# Patient Record
Sex: FEMALE | Race: WHITE | NOT HISPANIC OR LATINO | URBAN - METROPOLITAN AREA
[De-identification: names, ages, dates, MRNs, and addresses within clinical notes are randomized per-mention and may not be internally consistent; named-entity substitution may affect disease eponyms.]

---

## 2017-01-10 ENCOUNTER — OFFICE (OUTPATIENT)
Dept: URBAN - METROPOLITAN AREA CLINIC 11 | Facility: CLINIC | Age: 42
End: 2017-01-10

## 2017-01-10 VITALS
SYSTOLIC BLOOD PRESSURE: 139 MMHG | WEIGHT: 171 LBS | DIASTOLIC BLOOD PRESSURE: 65 MMHG | HEART RATE: 70 BPM | HEIGHT: 68 IN

## 2017-01-10 DIAGNOSIS — R10.31 RIGHT LOWER QUADRANT PAIN: ICD-10-CM

## 2017-01-10 DIAGNOSIS — K92.2 GASTROINTESTINAL HEMORRHAGE, UNSPECIFIED: ICD-10-CM

## 2017-01-10 DIAGNOSIS — R10.13 EPIGASTRIC PAIN: ICD-10-CM

## 2017-01-10 DIAGNOSIS — R10.32 LEFT LOWER QUADRANT PAIN: ICD-10-CM

## 2017-01-10 PROCEDURE — 99214 OFFICE O/P EST MOD 30 MIN: CPT | Performed by: INTERNAL MEDICINE

## 2017-01-10 NOTE — SERVICEHPINOTES
She has had intermittent diarrhea and has had green diarrhea about 5 times a day over the weekend and then noted maroon blood in her stool over the weekend (but was not seen).  She had the dark maroon blood three times on Sunday.  She had three stools yesterday with diarrhea and did not have a stool today.  She has not had fevers or chills.  She stated that she varies between diarrhea and constipation as her "normal pattern". She did take Pepto tablets yesterday and the bloating and discomfort were better.   She has been using Smooth Moves Tea to treat the constipation.   She has continued to have a pain in the LLQ and RLQ.  The pain has been a sharp stabbing pain.  It has been particularly worse with apples and oatmeal.  The pain starts within about 5-10 minutes of eating. There is both an intermittent also persistent pain. She did do the M2A but it was incomplete in that the pill cam did not appear to leave the stomach.  Follow up Xrays were without the pill cam being noted.

## 2017-01-10 NOTE — SERVICENOTES
We reviewed her studies and discussed the recurrent symptoms and issues.  The etiology is not quite clear and this seems to be a couple of different issues.  One is more of a functional type pain (ensure challenge u/s to r/o celiac flow issues is ordered), one is occult bleeding, endometriosis, and the other may be muscular wall pain.  I will contniued her eval with the above labs and studies with a referral to Dr. Davalos for eval as well. She was also given smaple of IBgard for her dyspepsia issues.

## 2017-01-14 LAB
AMYLASE: AMYLASE,SERUM: 188 U/L — HIGH (ref 28–120)
CBC COMPLETE BLOOD COUNT W/O DIFF: HEMATOCRIT: 38 % (ref 36–48)
CBC COMPLETE BLOOD COUNT W/O DIFF: HEMOGLOBIN: 12.7 G/DL (ref 12–16)
CBC COMPLETE BLOOD COUNT W/O DIFF: MCH: 30.2 PG (ref 25–35)
CBC COMPLETE BLOOD COUNT W/O DIFF: MCHC: 33.4 % (ref 30–38)
CBC COMPLETE BLOOD COUNT W/O DIFF: MCV: 90.5 FL (ref 78–102)
CBC COMPLETE BLOOD COUNT W/O DIFF: PLATELET COUNT: 415 K/UL (ref 150–450)
CBC COMPLETE BLOOD COUNT W/O DIFF: RBC DISTRIBUTION WIDTH: 15 % (ref 11.5–16)
CBC COMPLETE BLOOD COUNT W/O DIFF: RED BLOOD CELL COUNT: 4.2 M/UL (ref 4–5.5)
CBC COMPLETE BLOOD COUNT W/O DIFF: WHITE BLOOD CELL COUNT: 9.5 K/UL (ref 4–11)
COMPREHENSIVE METABOLIC PANEL: ALBUMIN: 4.5 G/DL (ref 3.5–5.2)
COMPREHENSIVE METABOLIC PANEL: ALKALINE PHOSPHATASE: 62 U/L (ref 34–115)
COMPREHENSIVE METABOLIC PANEL: CALCIUM TOTAL: 9.8 MG/DL (ref 8.5–10.5)
COMPREHENSIVE METABOLIC PANEL: CARBON DIOXIDE: 24 MEQ/L (ref 21–31)
COMPREHENSIVE METABOLIC PANEL: CHLORIDE: 100 MEQ/L (ref 96–106)
COMPREHENSIVE METABOLIC PANEL: CREATININE: 1.04 MG/DL (ref 0.6–1.3)
COMPREHENSIVE METABOLIC PANEL: FASTING/NON-FASTING: (no result)
COMPREHENSIVE METABOLIC PANEL: GLUCOSE: 86 MG/DL (ref 65–100)
COMPREHENSIVE METABOLIC PANEL: POTASSIUM: 4 MEQ/ML (ref 3.5–5.4)
COMPREHENSIVE METABOLIC PANEL: SGOT (AST): 22 U/L (ref 13–40)
COMPREHENSIVE METABOLIC PANEL: SGPT (ALT): 19 U/L (ref 7–52)
COMPREHENSIVE METABOLIC PANEL: SODIUM: 141 MEQ/L (ref 135–145)
COMPREHENSIVE METABOLIC PANEL: TOTAL BILIRUBIN: 0.4 MG/DL (ref 0.3–1.2)
COMPREHENSIVE METABOLIC PANEL: TOTAL PROTEIN: 7.6 G/DL (ref 6.4–8.3)
COMPREHENSIVE METABOLIC PANEL: UREA NITROGEN: 16 MG/DL (ref 6–20)
LIPASE: 70 U/L (ref 11–82)
Lab: (no result)
Lab: (no result)
Lab: >90 %
SED RATE - ERYTHROCYTE SED RATE: SED RATE: 19 MM/HR

## 2017-04-06 ENCOUNTER — ON CAMPUS - OUTPATIENT (OUTPATIENT)
Dept: URBAN - METROPOLITAN AREA HOSPITAL 97 | Facility: HOSPITAL | Age: 42
End: 2017-04-06
Payer: COMMERCIAL

## 2017-04-06 DIAGNOSIS — R19.5 OTHER FECAL ABNORMALITIES: ICD-10-CM

## 2017-04-06 DIAGNOSIS — R10.84 GENERALIZED ABDOMINAL PAIN: ICD-10-CM

## 2017-04-06 DIAGNOSIS — K29.70 GASTRITIS, UNSPECIFIED, WITHOUT BLEEDING: ICD-10-CM

## 2017-04-06 PROCEDURE — 99214 OFFICE O/P EST MOD 30 MIN: CPT | Performed by: INTERNAL MEDICINE

## 2017-04-07 PROCEDURE — 43239 EGD BIOPSY SINGLE/MULTIPLE: CPT | Performed by: INTERNAL MEDICINE

## 2017-10-19 ENCOUNTER — OFFICE (OUTPATIENT)
Dept: URBAN - METROPOLITAN AREA CLINIC 11 | Facility: CLINIC | Age: 42
End: 2017-10-19
Payer: COMMERCIAL

## 2017-10-19 VITALS
SYSTOLIC BLOOD PRESSURE: 141 MMHG | WEIGHT: 162 LBS | DIASTOLIC BLOOD PRESSURE: 67 MMHG | HEIGHT: 68 IN | HEART RATE: 81 BPM

## 2017-10-19 DIAGNOSIS — R94.5 ABNORMAL RESULTS OF LIVER FUNCTION STUDIES: ICD-10-CM

## 2017-10-19 DIAGNOSIS — R10.31 RIGHT LOWER QUADRANT PAIN: ICD-10-CM

## 2017-10-19 LAB
ANTINUCLEAR ANTIBODIES, IFA: NEGATIVE
CBC, PLATELET, NO DIFFERENTIAL: HEMATOCRIT: 36.7 % (ref 34–46.6)
CBC, PLATELET, NO DIFFERENTIAL: HEMOGLOBIN: 12.1 G/DL (ref 11.1–15.9)
CBC, PLATELET, NO DIFFERENTIAL: MCH: 29.8 PG (ref 26.6–33)
CBC, PLATELET, NO DIFFERENTIAL: MCHC: 33 G/DL (ref 31.5–35.7)
CBC, PLATELET, NO DIFFERENTIAL: MCV: 90 FL (ref 79–97)
CBC, PLATELET, NO DIFFERENTIAL: PLATELETS: 382 X10E3/UL — HIGH (ref 150–379)
CBC, PLATELET, NO DIFFERENTIAL: RBC: 4.06 X10E6/UL (ref 3.77–5.28)
CBC, PLATELET, NO DIFFERENTIAL: RDW: 14.1 % (ref 12.3–15.4)
CBC, PLATELET, NO DIFFERENTIAL: WBC: 4.7 X10E3/UL (ref 3.4–10.8)
HEPATIC FUNCTION PANEL (7): ALBUMIN, SERUM: 4.7 G/DL (ref 3.5–5.5)
HEPATIC FUNCTION PANEL (7): ALKALINE PHOSPHATASE, S: 76 IU/L (ref 39–117)
HEPATIC FUNCTION PANEL (7): ALT (SGPT): 10 IU/L (ref 0–32)
HEPATIC FUNCTION PANEL (7): AST (SGOT): 14 IU/L (ref 0–40)
HEPATIC FUNCTION PANEL (7): BILIRUBIN, DIRECT: 0.1 MG/DL (ref 0–0.4)
HEPATIC FUNCTION PANEL (7): BILIRUBIN, TOTAL: 0.2 MG/DL (ref 0–1.2)
HEPATIC FUNCTION PANEL (7): PROTEIN, TOTAL, SERUM: 7.2 G/DL (ref 6–8.5)

## 2017-10-19 PROCEDURE — 99214 OFFICE O/P EST MOD 30 MIN: CPT | Performed by: INTERNAL MEDICINE

## 2017-12-27 ENCOUNTER — OFFICE (OUTPATIENT)
Dept: URBAN - METROPOLITAN AREA CLINIC 11 | Facility: CLINIC | Age: 42
End: 2017-12-27
Payer: COMMERCIAL

## 2017-12-27 VITALS
HEART RATE: 73 BPM | WEIGHT: 155 LBS | SYSTOLIC BLOOD PRESSURE: 126 MMHG | HEIGHT: 68 IN | DIASTOLIC BLOOD PRESSURE: 73 MMHG

## 2017-12-27 DIAGNOSIS — R10.9 UNSPECIFIED ABDOMINAL PAIN: ICD-10-CM

## 2017-12-27 LAB
ANEMIA PROFILE A: BASO (ABSOLUTE): 0 X10E3/UL (ref 0–0.2)
ANEMIA PROFILE A: BASOS: 1 %
ANEMIA PROFILE A: EOS (ABSOLUTE): 0.3 X10E3/UL (ref 0–0.4)
ANEMIA PROFILE A: EOS: 5 %
ANEMIA PROFILE A: HEMATOCRIT: 37.4 % (ref 34–46.6)
ANEMIA PROFILE A: HEMATOLOGY COMMENTS: (no result)
ANEMIA PROFILE A: HEMOGLOBIN: 12.1 G/DL (ref 11.1–15.9)
ANEMIA PROFILE A: IMMATURE CELLS: (no result)
ANEMIA PROFILE A: IMMATURE GRANS (ABS): 0 X10E3/UL (ref 0–0.1)
ANEMIA PROFILE A: IMMATURE GRANULOCYTES: 0 %
ANEMIA PROFILE A: IRON BIND.CAP.(TIBC): 348 UG/DL (ref 250–450)
ANEMIA PROFILE A: IRON SATURATION: 24 % (ref 15–55)
ANEMIA PROFILE A: IRON, SERUM: 83 UG/DL (ref 27–159)
ANEMIA PROFILE A: LYMPHS (ABSOLUTE): 1.7 X10E3/UL (ref 0.7–3.1)
ANEMIA PROFILE A: LYMPHS: 27 %
ANEMIA PROFILE A: MCH: 28.9 PG (ref 26.6–33)
ANEMIA PROFILE A: MCHC: 32.4 G/DL (ref 31.5–35.7)
ANEMIA PROFILE A: MCV: 90 FL (ref 79–97)
ANEMIA PROFILE A: MONOCYTES(ABSOLUTE): 0.4 X10E3/UL (ref 0.1–0.9)
ANEMIA PROFILE A: MONOCYTES: 7 %
ANEMIA PROFILE A: NEUTROPHILS (ABSOLUTE): 3.9 X10E3/UL (ref 1.4–7)
ANEMIA PROFILE A: NEUTROPHILS: 60 %
ANEMIA PROFILE A: NRBC: (no result)
ANEMIA PROFILE A: PLATELETS: 305 X10E3/UL (ref 150–379)
ANEMIA PROFILE A: RBC: 4.18 X10E6/UL (ref 3.77–5.28)
ANEMIA PROFILE A: RDW: 14.5 % (ref 12.3–15.4)
ANEMIA PROFILE A: RETICULOCYTE COUNT: 1 % (ref 0.6–2.6)
ANEMIA PROFILE A: UIBC: 265 UG/DL (ref 131–425)
ANEMIA PROFILE A: WBC: 6.4 X10E3/UL (ref 3.4–10.8)

## 2017-12-27 PROCEDURE — 99213 OFFICE O/P EST LOW 20 MIN: CPT | Performed by: INTERNAL MEDICINE

## 2017-12-27 NOTE — SERVICEHPINOTES
43 yo F patient and  well known to me from previous hospitalization. She was last seen by my colleague Dr. Lerner on 10/2017, at which time a UGI with SBFT was ordered that was unremarkable. Pt states that she is on Plavix for a heart condition, and her cardiologist feels strongly that she needs to stay on this medication.  She also is in b/l foot braces due to Achilles tendon injury that she attributes to one dose of Cipro. She is concerned as last week she had an episode of large volume hematochezia (whole toilet bowl full of red blood), and since then she has had no further bleeding. This was associated with right and left lower quadrant pain. This pain was previously attributed to her GYN organs, and in9/2017 her partial hysterectomy was converted to a full hysterecomy. Ovaries were removed as well. She is currently having some residual lower abdominal discomfort, but no pain. No fevers, chills, CP, SOB, or hematemesis.

## 2017-12-29 ENCOUNTER — OFFICE (OUTPATIENT)
Dept: URBAN - METROPOLITAN AREA CLINIC 11 | Facility: CLINIC | Age: 42
End: 2017-12-29
Payer: COMMERCIAL

## 2017-12-29 DIAGNOSIS — K92.1 MELENA: ICD-10-CM

## 2017-12-29 PROCEDURE — 91110 GI TRC IMG INTRAL ESOPH-ILE: CPT | Performed by: INTERNAL MEDICINE

## 2018-01-10 ENCOUNTER — OFFICE (OUTPATIENT)
Dept: URBAN - METROPOLITAN AREA CLINIC 11 | Facility: CLINIC | Age: 43
End: 2018-01-10

## 2018-01-10 VITALS — HEIGHT: 68 IN | SYSTOLIC BLOOD PRESSURE: 136 MMHG | DIASTOLIC BLOOD PRESSURE: 77 MMHG | HEART RATE: 73 BPM

## 2018-01-10 DIAGNOSIS — R10.9 UNSPECIFIED ABDOMINAL PAIN: ICD-10-CM

## 2018-01-10 PROCEDURE — 99213 OFFICE O/P EST LOW 20 MIN: CPT | Performed by: INTERNAL MEDICINE

## 2018-12-14 ENCOUNTER — OFFICE (OUTPATIENT)
Dept: URBAN - METROPOLITAN AREA CLINIC 11 | Facility: CLINIC | Age: 43
End: 2018-12-14
Payer: COMMERCIAL

## 2018-12-14 VITALS
HEART RATE: 69 BPM | WEIGHT: 160 LBS | SYSTOLIC BLOOD PRESSURE: 131 MMHG | HEIGHT: 68 IN | DIASTOLIC BLOOD PRESSURE: 86 MMHG

## 2018-12-14 DIAGNOSIS — R19.7 DIARRHEA, UNSPECIFIED: ICD-10-CM

## 2018-12-14 PROCEDURE — 99213 OFFICE O/P EST LOW 20 MIN: CPT | Performed by: INTERNAL MEDICINE

## 2018-12-14 NOTE — SERVICEHPINOTES
She presents for evaluation of diarrhea.  She has had some issues with diarrhea since being on an antibiotic that she did not recall.  She is having about 5 stools a day and they are brown and liquid.  She has also been on a steroid back for her achilles surgical issues.  She has not had blood in her stools.  She has had some issues wiht a point pain in the LLQ and RLQ as she had ha in years past.  It seems to correlate at times with the dairrhea and sometimes not(she has had an extensive evaluation in the past with no particular cause but it did improve with pelvic floor therapy).  She had a GI bleed a year ago with an extensive evaluation without findings. She has been meds due to bilater achilles repairs. She has had a PFO closure and is on Plavix long term

## 2018-12-14 NOTE — SERVICENOTES
We discussed her recent diarrhea and leandro thtis may be an antibiotic associated colitis.  Stools were ordered.  She has also been taking a smooth move tea with senna which could cause issues.  If her dairrhea persist and stools are negative we can discuss an FSC with bx.

## 2019-09-17 ENCOUNTER — OFFICE (OUTPATIENT)
Dept: URBAN - METROPOLITAN AREA CLINIC 11 | Facility: CLINIC | Age: 44
End: 2019-09-17

## 2019-09-17 VITALS
WEIGHT: 178 LBS | HEIGHT: 68 IN | SYSTOLIC BLOOD PRESSURE: 130 MMHG | HEART RATE: 71 BPM | DIASTOLIC BLOOD PRESSURE: 68 MMHG

## 2019-09-17 DIAGNOSIS — K21.9 GASTRO-ESOPHAGEAL REFLUX DISEASE WITHOUT ESOPHAGITIS: ICD-10-CM

## 2019-09-17 DIAGNOSIS — K59.00 CONSTIPATION, UNSPECIFIED: ICD-10-CM

## 2019-09-17 PROCEDURE — 99214 OFFICE O/P EST MOD 30 MIN: CPT | Performed by: INTERNAL MEDICINE

## 2019-09-17 RX ORDER — FAMOTIDINE 40 MG/1
40 TABLET, FILM COATED ORAL
Qty: 90 | Refills: 2 | Status: ACTIVE
Start: 2019-09-17

## 2019-09-17 RX ORDER — PANTOPRAZOLE SODIUM 40 MG/1
TABLET, DELAYED RELEASE ORAL
Qty: 60 | Refills: 2 | Status: ACTIVE

## 2019-09-17 RX ORDER — PRUCALOPRIDE 2 MG/1
2 TABLET, FILM COATED ORAL
Qty: 30 | Refills: 2 | Status: COMPLETED
Start: 2019-09-17 | End: 2019-09-25

## 2019-09-17 NOTE — SERVICENOTES
We discussed her new dx of MTHFR and potential difficulties with some meds.  I will continue the PPIs for her GERD add Pepcid.  With her constipation, we can do a trial of Motegrity (her GFR is over 60).  We discussed her list of food allergies and referral to Dr. Espinal.

## 2019-09-17 NOTE — SERVICEHPINOTES
She presents stating that she has had further issues with reflux and was recently dx with mutiple food allergies (it is quite an extensive list) as wellas MTHFR and COMT genes.  She has also been noted to have a decrease in her GFR and has seen nephrology.  She was found to have elevated Cholesterol and Triglycerides and can not tolerate statins and is on a fish product.  She has had issues with her reflux for a while and had not had time to be seen.  She stated that if she is out of her Protonix for a day, she will have a burning in her chest.  "I will be dying.".  She has had some nocturnal regurgitation as well.  She has been on Protonix twice daily.  She has not had dysphagia.   She has noted difficulties with constipation, which increased after a recent trip to [place].  She has started skipping upto three days.  She has restarted her smooth moves tea and her stools have improved again but it causes very soft stool.

## 2019-12-17 ENCOUNTER — OFFICE (OUTPATIENT)
Dept: URBAN - METROPOLITAN AREA CLINIC 11 | Facility: CLINIC | Age: 44
End: 2019-12-17

## 2019-12-17 VITALS — DIASTOLIC BLOOD PRESSURE: 38 MMHG | HEIGHT: 67 IN | WEIGHT: 173 LBS | SYSTOLIC BLOOD PRESSURE: 117 MMHG

## 2019-12-17 DIAGNOSIS — K92.2 GASTROINTESTINAL HEMORRHAGE, UNSPECIFIED: ICD-10-CM

## 2019-12-17 DIAGNOSIS — R19.7 DIARRHEA, UNSPECIFIED: ICD-10-CM

## 2019-12-17 DIAGNOSIS — K21.9 GASTRO-ESOPHAGEAL REFLUX DISEASE WITHOUT ESOPHAGITIS: ICD-10-CM

## 2019-12-17 PROCEDURE — 99214 OFFICE O/P EST MOD 30 MIN: CPT | Performed by: INTERNAL MEDICINE

## 2019-12-17 RX ORDER — PRUCALOPRIDE 1 MG/1
1 TABLET, FILM COATED ORAL
Qty: 90 | Refills: 2 | Status: ACTIVE
Start: 2019-12-17

## 2019-12-17 NOTE — SERVICEHPINOTES
She presetns for f/u of her IBS.  She has been having difficulties with diarrhea and constipation.  She has been using Smooth Moves Tea nightly to avoid constipation (she would not have a stool that next day). Without the tea, she will not have a stool for three days.  This however has been causing issues with dairrhea.  She has worsening diarrhea after eating.She has had some intermittent red blood with her stools.  She has a hx of prior GI bleeding.She has been on Protonix and if she misses it she will have heartburn and nocturanl backwash.  On the BID Protonix she has control of her symptoms.She has been on Plavix for her hx of CVA.

## 2019-12-17 NOTE — SERVICENOTES
Wtih her continued diarrhea, which I suspect his OTC med related, and bleeding, I would do the f/u colonoscopy.  In addition, I would have her try the renal dosing of the Motegrity and stop the teas.  With her GERD control issues and hx of Saul's, I would do the f/u EGD as well including small bowel bx.

## 2020-02-10 ENCOUNTER — AMBULATORY SURGICAL CENTER (OUTPATIENT)
Dept: URBAN - METROPOLITAN AREA SURGERY 3 | Facility: SURGERY | Age: 45
End: 2020-02-10

## 2020-02-10 ENCOUNTER — OFFICE (OUTPATIENT)
Dept: URBAN - METROPOLITAN AREA PATHOLOGY 22 | Facility: PATHOLOGY | Age: 45
End: 2020-02-10

## 2020-02-10 ENCOUNTER — AMBULATORY SURGICAL CENTER (OUTPATIENT)
Dept: URBAN - METROPOLITAN AREA SURGERY 3 | Facility: SURGERY | Age: 45
End: 2020-02-10
Payer: COMMERCIAL

## 2020-02-10 VITALS
OXYGEN SATURATION: 98 % | DIASTOLIC BLOOD PRESSURE: 71 MMHG | DIASTOLIC BLOOD PRESSURE: 73 MMHG | HEIGHT: 67 IN | RESPIRATION RATE: 18 BRPM | DIASTOLIC BLOOD PRESSURE: 72 MMHG | DIASTOLIC BLOOD PRESSURE: 71 MMHG | HEART RATE: 56 BPM | SYSTOLIC BLOOD PRESSURE: 114 MMHG | HEIGHT: 67 IN | RESPIRATION RATE: 17 BRPM | RESPIRATION RATE: 22 BRPM | DIASTOLIC BLOOD PRESSURE: 54 MMHG | DIASTOLIC BLOOD PRESSURE: 54 MMHG | SYSTOLIC BLOOD PRESSURE: 102 MMHG | HEART RATE: 57 BPM | DIASTOLIC BLOOD PRESSURE: 77 MMHG | DIASTOLIC BLOOD PRESSURE: 71 MMHG | RESPIRATION RATE: 17 BRPM | TEMPERATURE: 98.1 F | RESPIRATION RATE: 22 BRPM | WEIGHT: 165 LBS | SYSTOLIC BLOOD PRESSURE: 102 MMHG | TEMPERATURE: 98.8 F | HEART RATE: 56 BPM | SYSTOLIC BLOOD PRESSURE: 102 MMHG | DIASTOLIC BLOOD PRESSURE: 72 MMHG | TEMPERATURE: 98.1 F | RESPIRATION RATE: 17 BRPM | HEART RATE: 57 BPM | SYSTOLIC BLOOD PRESSURE: 112 MMHG | DIASTOLIC BLOOD PRESSURE: 73 MMHG | OXYGEN SATURATION: 99 % | RESPIRATION RATE: 18 BRPM | HEART RATE: 57 BPM | OXYGEN SATURATION: 98 % | TEMPERATURE: 98.1 F | OXYGEN SATURATION: 98 % | DIASTOLIC BLOOD PRESSURE: 77 MMHG | RESPIRATION RATE: 22 BRPM | DIASTOLIC BLOOD PRESSURE: 73 MMHG | OXYGEN SATURATION: 99 % | SYSTOLIC BLOOD PRESSURE: 114 MMHG | OXYGEN SATURATION: 99 % | WEIGHT: 165 LBS | TEMPERATURE: 98.8 F | DIASTOLIC BLOOD PRESSURE: 54 MMHG | RESPIRATION RATE: 16 BRPM | SYSTOLIC BLOOD PRESSURE: 92 MMHG | OXYGEN SATURATION: 96 % | WEIGHT: 165 LBS | TEMPERATURE: 98.8 F | HEART RATE: 56 BPM | DIASTOLIC BLOOD PRESSURE: 72 MMHG | SYSTOLIC BLOOD PRESSURE: 114 MMHG | HEIGHT: 67 IN | SYSTOLIC BLOOD PRESSURE: 112 MMHG | RESPIRATION RATE: 16 BRPM | SYSTOLIC BLOOD PRESSURE: 92 MMHG | RESPIRATION RATE: 16 BRPM | SYSTOLIC BLOOD PRESSURE: 112 MMHG | OXYGEN SATURATION: 96 % | OXYGEN SATURATION: 96 % | RESPIRATION RATE: 18 BRPM | SYSTOLIC BLOOD PRESSURE: 92 MMHG | DIASTOLIC BLOOD PRESSURE: 77 MMHG

## 2020-02-10 DIAGNOSIS — K63.5 POLYP OF COLON: ICD-10-CM

## 2020-02-10 DIAGNOSIS — K62.5 HEMORRHAGE OF ANUS AND RECTUM: ICD-10-CM

## 2020-02-10 DIAGNOSIS — K92.2 GASTROINTESTINAL HEMORRHAGE, UNSPECIFIED: ICD-10-CM

## 2020-02-10 DIAGNOSIS — R19.7 DIARRHEA, UNSPECIFIED: ICD-10-CM

## 2020-02-10 DIAGNOSIS — K52.89 OTHER SPECIFIED NONINFECTIVE GASTROENTERITIS AND COLITIS: ICD-10-CM

## 2020-02-10 DIAGNOSIS — K29.50 UNSPECIFIED CHRONIC GASTRITIS WITHOUT BLEEDING: ICD-10-CM

## 2020-02-10 DIAGNOSIS — K29.70 GASTRITIS, UNSPECIFIED, WITHOUT BLEEDING: ICD-10-CM

## 2020-02-10 DIAGNOSIS — K21.9 GASTRO-ESOPHAGEAL REFLUX DISEASE WITHOUT ESOPHAGITIS: ICD-10-CM

## 2020-02-10 DIAGNOSIS — K44.9 DIAPHRAGMATIC HERNIA WITHOUT OBSTRUCTION OR GANGRENE: ICD-10-CM

## 2020-02-10 PROCEDURE — 45380 COLONOSCOPY AND BIOPSY: CPT | Performed by: INTERNAL MEDICINE

## 2020-02-10 PROCEDURE — 43239 EGD BIOPSY SINGLE/MULTIPLE: CPT | Performed by: INTERNAL MEDICINE

## 2020-03-18 PROBLEM — K92.1 HEMATOCHEZIA: Status: ACTIVE | Noted: 2020-02-10
